# Patient Record
Sex: FEMALE
[De-identification: names, ages, dates, MRNs, and addresses within clinical notes are randomized per-mention and may not be internally consistent; named-entity substitution may affect disease eponyms.]

---

## 2020-03-31 PROBLEM — Z00.00 ENCOUNTER FOR PREVENTIVE HEALTH EXAMINATION: Status: ACTIVE | Noted: 2020-03-31

## 2020-04-01 ENCOUNTER — APPOINTMENT (OUTPATIENT)
Dept: DISASTER EMERGENCY | Facility: CLINIC | Age: 65
End: 2020-04-01
Payer: COMMERCIAL

## 2020-04-01 ENCOUNTER — LABORATORY RESULT (OUTPATIENT)
Age: 65
End: 2020-04-01

## 2020-04-01 VITALS
DIASTOLIC BLOOD PRESSURE: 82 MMHG | TEMPERATURE: 99.1 F | SYSTOLIC BLOOD PRESSURE: 130 MMHG | OXYGEN SATURATION: 98 % | RESPIRATION RATE: 14 BRPM | HEART RATE: 85 BPM

## 2020-04-01 DIAGNOSIS — Z20.828 CONTACT WITH AND (SUSPECTED) EXPOSURE TO OTHER VIRAL COMMUNICABLE DISEASES: ICD-10-CM

## 2020-04-01 PROCEDURE — 99213 OFFICE O/P EST LOW 20 MIN: CPT

## 2020-04-01 NOTE — HISTORY OF PRESENT ILLNESS
[] : severe myalgia [With Confirmed Case] : patient exposed to a confirmed case of COVID-19 [Age >= 60 years] : age >= 60 years [Heart Disease] : heart disease [None] : none [Clear] : clear [Good Air Entry] : good air entry [Speaks in full sentences] : speaks in full sentences [Normal O2 sat at rest] : normal O2 sat at rest [Grossly normal, interacts, not tired or weak] : grossly normal, interacts, not tired or weak [COVID-19 testing ordered and done] : COVID-19 testing ordered and done [Discharged with current Quarantine instructions and advised of signs of worsening illness.] : Patient discharged with current quarantine instructions and advised of signs of worsening illness. Patient told to seek emergent care if symptoms occur. [FreeTextEntry1] : 64 y.o F with HTN, HLD, T2DM, presenting today with c.o subjective fevers x 2 weeks. Associated symptoms include chills, body aches, lightheadedness, cough, sore throat. Has attempted hot tea, hot water with lemon, and tylenol with some improvement. Admits to mild SOB at times, nausea, decreased appetite. 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 Denies CP, WHITAKER, palpitations, lightheadedness, vomiting, diarrhea, or recent travel.\par \par Home Meds-\par -Valsartan\par -Metformin\par -Atorvastatin  [TextBox_57] : works at grocery store  [TextBox_107] : \par -Likely dx of COVID-19, will test given age and comorbidities \par -Supportive care advised: Encouraged to increase fluids, rest, and take tylenol prn as per 's recommendations\par -Discussed quarantine until 72 hours symptom free including fever free without use of tylenol\par -Literature on COVID-19 and measures to prevent exposure to others provided and reviewed with pt\par -Pt to be notified by Law of results\par -Work note provided

## 2020-06-02 ENCOUNTER — TRANSCRIPTION ENCOUNTER (OUTPATIENT)
Age: 65
End: 2020-06-02